# Patient Record
Sex: FEMALE | Race: WHITE | NOT HISPANIC OR LATINO | URBAN - METROPOLITAN AREA
[De-identification: names, ages, dates, MRNs, and addresses within clinical notes are randomized per-mention and may not be internally consistent; named-entity substitution may affect disease eponyms.]

---

## 2024-06-04 ENCOUNTER — OFFICE (OUTPATIENT)
Dept: URBAN - METROPOLITAN AREA CLINIC 19 | Facility: CLINIC | Age: 49
End: 2024-06-04
Payer: COMMERCIAL

## 2024-06-04 VITALS
WEIGHT: 155 LBS | DIASTOLIC BLOOD PRESSURE: 78 MMHG | SYSTOLIC BLOOD PRESSURE: 140 MMHG | HEIGHT: 62 IN | HEART RATE: 88 BPM | OXYGEN SATURATION: 98 %

## 2024-06-04 DIAGNOSIS — K50.90 CROHN'S DISEASE, UNSPECIFIED, WITHOUT COMPLICATIONS: ICD-10-CM

## 2024-06-04 DIAGNOSIS — K59.00 CONSTIPATION, UNSPECIFIED: ICD-10-CM

## 2024-06-04 PROCEDURE — 99204 OFFICE O/P NEW MOD 45 MIN: CPT | Performed by: PHYSICIAN ASSISTANT

## 2024-06-04 NOTE — SERVICENOTES
Pending results will consider increasing lialda dose or changing medication. Also monitoring for improvement of constipation

## 2024-06-04 NOTE — SERVICEHPINOTES
48-year-old female with a PMHx of Chrons Disease presents  for GI consult on constiaption for multiple years. She reports she is flipping between diarrhea and constipation but reports she has been mostly constipated before the last 2 weeks, she reports she is either running to the bathroom or cannot go at all. She reports her primary care started her on linzess 2 weeks ago and she reports she has a lot diarrhea from that, reports she was having a BM once a day with that but reports being in the bathroom for a long time with just diarrhea. She also reports trying milk of magnesia which was waking her up in the middle of the night with diarrhea. 
leslie nelson She reports before these last 2 weeks she was having "shavings" when describing her stool. She reports her stools have been like a "thin piece of paper, almost like a ribbon. She reports before the diarrhea she was having a bowel movement every 2 days that was thin stools. She reports she was not having any diarrhea before starting the Linzess recently, and reports she was mostly having constipation. She reports she has been having some fatigue as well. She reports she is on Lialda 1.2 grams once a day, reports she has not been having great results from that. She reports she has mucus in the stool and still has some abdominal pain under her right rib. She denies any blood in the stool. She reports she still has her gallbladder. 
leslie nelson She denies being on any blood thinners or weight loss injectables.  
leslie nelson  PREVIOUS HISTORY---------leslie
brPatient was seen at [facility] on 05/13/2024. Records show she has been having either diarrhea or constipation. She denied blood in the stool but reports some mucus. She reports no abdominal pain unless she is constipatied. She takes milk of magnesia when constipated but it causes diarrhea. More constipation than diarrhea. She took MiraLax in the past before seems to work fine. She reports what she eats does not change her symptoms. She still has her gallbladder. 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 From a note on 04/02/2024, she was having bowel movements stenosis paper looks like ribbons. She is supposed to take milk of magnesia every night and usually takes it every 2-3 days. Since starting the med of magnesia she had not had any right upper quadrant pain. Bowel movements light-colored, almost yellow. Denies blood in the stool.She had a negative x-ray of her abdomen AP on 05/29/2024 in the negative gallbladder ultrasound on 05/29/2024. Her last colonoscopy was 12/29/2022 which showed injections, erosions, erythema, exudates and ulceration in the sigmoid colon and descending colon compatible with Crohn's disease (biopsy was taken). Previous surgery and proximal rectum. Normal mucosa in the rectum. Great stage I internal hemorrhoids. Biopsy results showed sections showed multiple fragments of benign colonic type mucosa with moderate architectural glandular distortion irregularity. There is moderate acute and chronic inflammation with areas of multifocal acute cryptitis and crypt abscess formation. There is also new submucosal granulomas identified. There is a moderate increase of the plasma cells with the lamina propria. No high-grade dysplasia or carcinoma was identified.On recent CBC from 04/02/2024, blood work was normal except for increased monocyte levels. On CMP Calcium was slightly elevated, otherwise CMP was normalShe had a negative EGD on 12/27/2022. Normal esophagus, normal stomach, normal duodenm.

## 2024-06-27 ENCOUNTER — AMBULATORY SURGICAL CENTER (OUTPATIENT)
Dept: URBAN - METROPOLITAN AREA SURGERY 2 | Facility: SURGERY | Age: 49
End: 2024-06-27
Payer: COMMERCIAL

## 2024-06-27 ENCOUNTER — OFFICE (OUTPATIENT)
Dept: URBAN - METROPOLITAN AREA PATHOLOGY 12 | Facility: PATHOLOGY | Age: 49
End: 2024-06-27

## 2024-06-27 VITALS
DIASTOLIC BLOOD PRESSURE: 85 MMHG | HEART RATE: 69 BPM | SYSTOLIC BLOOD PRESSURE: 156 MMHG | DIASTOLIC BLOOD PRESSURE: 87 MMHG | OXYGEN SATURATION: 98 % | SYSTOLIC BLOOD PRESSURE: 156 MMHG | SYSTOLIC BLOOD PRESSURE: 136 MMHG | OXYGEN SATURATION: 97 % | TEMPERATURE: 98 F | SYSTOLIC BLOOD PRESSURE: 150 MMHG | OXYGEN SATURATION: 98 % | TEMPERATURE: 98 F | OXYGEN SATURATION: 97 % | DIASTOLIC BLOOD PRESSURE: 85 MMHG | HEART RATE: 72 BPM | HEART RATE: 72 BPM | HEART RATE: 69 BPM | SYSTOLIC BLOOD PRESSURE: 150 MMHG | HEART RATE: 71 BPM | SYSTOLIC BLOOD PRESSURE: 157 MMHG | DIASTOLIC BLOOD PRESSURE: 87 MMHG | HEART RATE: 66 BPM | DIASTOLIC BLOOD PRESSURE: 97 MMHG | HEART RATE: 75 BPM | TEMPERATURE: 98.2 F | SYSTOLIC BLOOD PRESSURE: 157 MMHG | OXYGEN SATURATION: 100 % | DIASTOLIC BLOOD PRESSURE: 73 MMHG | HEART RATE: 72 BPM | DIASTOLIC BLOOD PRESSURE: 97 MMHG | SYSTOLIC BLOOD PRESSURE: 136 MMHG | RESPIRATION RATE: 16 BRPM | WEIGHT: 156 LBS | SYSTOLIC BLOOD PRESSURE: 123 MMHG | SYSTOLIC BLOOD PRESSURE: 123 MMHG | SYSTOLIC BLOOD PRESSURE: 136 MMHG | DIASTOLIC BLOOD PRESSURE: 97 MMHG | TEMPERATURE: 98.2 F | WEIGHT: 156 LBS | HEART RATE: 66 BPM | HEART RATE: 71 BPM | OXYGEN SATURATION: 100 % | DIASTOLIC BLOOD PRESSURE: 85 MMHG | TEMPERATURE: 98.2 F | OXYGEN SATURATION: 99 % | HEART RATE: 71 BPM | HEIGHT: 62 IN | RESPIRATION RATE: 16 BRPM | SYSTOLIC BLOOD PRESSURE: 150 MMHG | SYSTOLIC BLOOD PRESSURE: 157 MMHG | OXYGEN SATURATION: 100 % | HEART RATE: 75 BPM | DIASTOLIC BLOOD PRESSURE: 73 MMHG | HEART RATE: 75 BPM | TEMPERATURE: 98 F | OXYGEN SATURATION: 97 % | OXYGEN SATURATION: 98 % | OXYGEN SATURATION: 99 % | DIASTOLIC BLOOD PRESSURE: 87 MMHG | RESPIRATION RATE: 16 BRPM | HEART RATE: 66 BPM | OXYGEN SATURATION: 99 % | WEIGHT: 156 LBS | HEART RATE: 69 BPM | SYSTOLIC BLOOD PRESSURE: 156 MMHG | HEIGHT: 62 IN | DIASTOLIC BLOOD PRESSURE: 73 MMHG | HEIGHT: 62 IN | SYSTOLIC BLOOD PRESSURE: 123 MMHG

## 2024-06-27 DIAGNOSIS — K63.89 OTHER SPECIFIED DISEASES OF INTESTINE: ICD-10-CM

## 2024-06-27 DIAGNOSIS — K50.90 CROHN'S DISEASE, UNSPECIFIED, WITHOUT COMPLICATIONS: ICD-10-CM

## 2024-06-27 DIAGNOSIS — K62.89 OTHER SPECIFIED DISEASES OF ANUS AND RECTUM: ICD-10-CM

## 2024-06-27 PROCEDURE — 45331 SIGMOIDOSCOPY AND BIOPSY: CPT | Performed by: INTERNAL MEDICINE

## 2024-06-27 PROCEDURE — 88305 TISSUE EXAM BY PATHOLOGIST: CPT | Performed by: PATHOLOGY

## 2024-07-02 LAB
GASTRO ONE PATHOLOGY: PDF REPORT: (no result)

## 2024-08-07 ENCOUNTER — OFFICE (OUTPATIENT)
Dept: URBAN - METROPOLITAN AREA CLINIC 19 | Facility: CLINIC | Age: 49
End: 2024-08-07
Payer: COMMERCIAL

## 2024-08-07 VITALS
SYSTOLIC BLOOD PRESSURE: 142 MMHG | WEIGHT: 162 LBS | DIASTOLIC BLOOD PRESSURE: 105 MMHG | HEART RATE: 64 BPM | DIASTOLIC BLOOD PRESSURE: 84 MMHG | HEIGHT: 62 IN | OXYGEN SATURATION: 95 % | SYSTOLIC BLOOD PRESSURE: 174 MMHG

## 2024-08-07 DIAGNOSIS — R10.13 EPIGASTRIC PAIN: ICD-10-CM

## 2024-08-07 DIAGNOSIS — K50.90 CROHN'S DISEASE, UNSPECIFIED, WITHOUT COMPLICATIONS: ICD-10-CM

## 2024-08-07 DIAGNOSIS — K59.00 CONSTIPATION, UNSPECIFIED: ICD-10-CM

## 2024-08-07 PROCEDURE — 99214 OFFICE O/P EST MOD 30 MIN: CPT | Performed by: PHYSICIAN ASSISTANT
